# Patient Record
Sex: FEMALE | Race: WHITE | NOT HISPANIC OR LATINO | URBAN - METROPOLITAN AREA
[De-identification: names, ages, dates, MRNs, and addresses within clinical notes are randomized per-mention and may not be internally consistent; named-entity substitution may affect disease eponyms.]

---

## 2017-06-07 ENCOUNTER — INPATIENT (INPATIENT)
Facility: HOSPITAL | Age: 82
LOS: 4 days | Discharge: HOME | DRG: 378 | End: 2017-06-12
Attending: INTERNAL MEDICINE | Admitting: INTERNAL MEDICINE
Payer: MEDICARE

## 2017-06-07 VITALS
TEMPERATURE: 98 F | DIASTOLIC BLOOD PRESSURE: 57 MMHG | SYSTOLIC BLOOD PRESSURE: 101 MMHG | HEART RATE: 96 BPM | RESPIRATION RATE: 18 BRPM

## 2017-06-07 LAB
ALBUMIN SERPL ELPH-MCNC: 3.8 G/DL — SIGNIFICANT CHANGE UP (ref 3.3–5)
ALP SERPL-CCNC: 93 U/L — SIGNIFICANT CHANGE UP (ref 40–120)
ALT FLD-CCNC: 16 U/L RC — SIGNIFICANT CHANGE UP (ref 10–45)
ANION GAP SERPL CALC-SCNC: 12 MMOL/L — SIGNIFICANT CHANGE UP (ref 5–17)
APTT BLD: 61.5 SEC — HIGH (ref 27.5–37.4)
AST SERPL-CCNC: 20 U/L — SIGNIFICANT CHANGE UP (ref 10–40)
BASOPHILS # BLD AUTO: 0.1 K/UL — SIGNIFICANT CHANGE UP (ref 0–0.2)
BASOPHILS NFR BLD AUTO: 1.5 % — SIGNIFICANT CHANGE UP (ref 0–2)
BILIRUB SERPL-MCNC: 0.8 MG/DL — SIGNIFICANT CHANGE UP (ref 0.2–1.2)
BUN SERPL-MCNC: 38 MG/DL — HIGH (ref 7–23)
CALCIUM SERPL-MCNC: 8.9 MG/DL — SIGNIFICANT CHANGE UP (ref 8.4–10.5)
CHLORIDE SERPL-SCNC: 103 MMOL/L — SIGNIFICANT CHANGE UP (ref 96–108)
CO2 SERPL-SCNC: 24 MMOL/L — SIGNIFICANT CHANGE UP (ref 22–31)
CREAT SERPL-MCNC: 0.97 MG/DL — SIGNIFICANT CHANGE UP (ref 0.5–1.3)
EOSINOPHIL # BLD AUTO: 0.1 K/UL — SIGNIFICANT CHANGE UP (ref 0–0.5)
EOSINOPHIL NFR BLD AUTO: 2.2 % — SIGNIFICANT CHANGE UP (ref 0–6)
GLUCOSE SERPL-MCNC: 114 MG/DL — HIGH (ref 70–99)
HCT VFR BLD CALC: 20.4 % — CRITICAL LOW (ref 34.5–45)
HGB BLD-MCNC: 6.3 G/DL — CRITICAL LOW (ref 11.5–15.5)
INR BLD: 10.56 RATIO — CRITICAL HIGH (ref 0.88–1.16)
LYMPHOCYTES # BLD AUTO: 0.9 K/UL — LOW (ref 1–3.3)
LYMPHOCYTES # BLD AUTO: 23 % — SIGNIFICANT CHANGE UP (ref 13–44)
MCHC RBC-ENTMCNC: 25.5 PG — LOW (ref 27–34)
MCHC RBC-ENTMCNC: 30.9 GM/DL — LOW (ref 32–36)
MCV RBC AUTO: 82.5 FL — SIGNIFICANT CHANGE UP (ref 80–100)
MONOCYTES # BLD AUTO: 0.4 K/UL — SIGNIFICANT CHANGE UP (ref 0–0.9)
MONOCYTES NFR BLD AUTO: 10.2 % — SIGNIFICANT CHANGE UP (ref 2–14)
NEUTROPHILS # BLD AUTO: 2.5 K/UL — SIGNIFICANT CHANGE UP (ref 1.8–7.4)
NEUTROPHILS NFR BLD AUTO: 63.1 % — SIGNIFICANT CHANGE UP (ref 43–77)
PLATELET # BLD AUTO: 121 K/UL — LOW (ref 150–400)
POTASSIUM SERPL-MCNC: 4.8 MMOL/L — SIGNIFICANT CHANGE UP (ref 3.5–5.3)
POTASSIUM SERPL-SCNC: 4.8 MMOL/L — SIGNIFICANT CHANGE UP (ref 3.5–5.3)
PROT SERPL-MCNC: 6.7 G/DL — SIGNIFICANT CHANGE UP (ref 6–8.3)
PROTHROM AB SERPL-ACNC: 119.7 SEC — HIGH (ref 9.8–12.7)
RBC # BLD: 2.47 M/UL — LOW (ref 3.8–5.2)
RBC # FLD: 20.4 % — HIGH (ref 10.3–14.5)
SODIUM SERPL-SCNC: 139 MMOL/L — SIGNIFICANT CHANGE UP (ref 135–145)
WBC # BLD: 3.9 K/UL — SIGNIFICANT CHANGE UP (ref 3.8–10.5)
WBC # FLD AUTO: 3.9 K/UL — SIGNIFICANT CHANGE UP (ref 3.8–10.5)

## 2017-06-07 PROCEDURE — 93010 ELECTROCARDIOGRAM REPORT: CPT

## 2017-06-07 PROCEDURE — 99285 EMERGENCY DEPT VISIT HI MDM: CPT | Mod: 25

## 2017-06-07 PROCEDURE — 71010: CPT | Mod: 26

## 2017-06-07 RX ORDER — PANTOPRAZOLE SODIUM 20 MG/1
80 TABLET, DELAYED RELEASE ORAL ONCE
Qty: 0 | Refills: 0 | Status: COMPLETED | OUTPATIENT
Start: 2017-06-07 | End: 2017-06-07

## 2017-06-07 RX ORDER — SODIUM CHLORIDE 9 MG/ML
1000 INJECTION INTRAMUSCULAR; INTRAVENOUS; SUBCUTANEOUS ONCE
Qty: 0 | Refills: 0 | Status: COMPLETED | OUTPATIENT
Start: 2017-06-07 | End: 2017-06-07

## 2017-06-07 RX ADMIN — SODIUM CHLORIDE 500 MILLILITER(S): 9 INJECTION INTRAMUSCULAR; INTRAVENOUS; SUBCUTANEOUS at 23:00

## 2017-06-07 NOTE — ED ADULT NURSE NOTE - ED STAT RN HANDOFF DETAILS
Report given to TONI Benitez    Pts VS stable she is in no acute distress, pt tolerated 1st blood transfusion well. Pt pending bed and 2nd transfusion. Safety and comfort maintained

## 2017-06-07 NOTE — ED ADULT NURSE NOTE - OBJECTIVE STATEMENT
Pt is a 89 yo female A&Ox4 sent in from her PMD for INR of 10 as per daughter. Pt states that she takes coumadin at home for Afib, For the past few days she has been feeling increasing fatigue. Yesterday she had a dark stool and two episodes of a bloody cough. Pt denies any CP or SOB no N/V/D no fever or chills. Pt denies any pain at this time. Pt has a pmh of HTN, Afib and Bladder Ca.

## 2017-06-07 NOTE — ED PROVIDER NOTE - PROGRESS NOTE DETAILS
Spoke to daughter and patient regarding results. Discussed that she is in Afib at this time, but with GI bleed and anemia, will have to reverse INR at this time and transfuse blood. Patient and daughter in agreement w plan, discussed w Dr. Rasmussen. Admit patient to telemetry. RG

## 2017-06-07 NOTE — ED PROVIDER NOTE - ATTENDING CONTRIBUTION TO CARE
****ATTENDING**** 89yo f hx Afib on coumadin, HTN presents with elevated INR. Patient co generalized weakness, had one episode of "spitting up blood" and dark stools. Has been on coumadin long standing and no change in dose. Pt had outpt blood work that showed elevated INR. No prior hx GI bleed or ulcers. Denies cp, palp, sob. On exam, patient appears pale, chest cta b/l, +s1s2, irregular, Abd soft nd/nt +BS. Patient's VS reviewed. Check Labs, TS, Coags. Eval for anemia and supratherapeutic INR.   Patient noted to be guaiac positive in ER.

## 2017-06-07 NOTE — ED PROVIDER NOTE - MEDICAL DECISION MAKING DETAILS
****ATTENDING**** 89yo f hx Afib on coumadin, HTN presents with elevated INR. Patient co generalized weakness, had one episode of "spitting up blood" and dark stools. Has been on coumadin long standing and no change in dose. Pt had outpt blood work that showed elevated INR. No prior hx GI bleed or ulcers. Denies cp, palp, sob. On exam, patient appears pale, chest cta b/l, +s1s2, irregular, Abd soft nd/nt +BS. Patient's VS reviewed. Check Labs, TS, Coags. Eval for anemia and supratherapeutic INR.   Patient noted to be guaiac positive in ER.

## 2017-06-07 NOTE — ED PROVIDER NOTE - OBJECTIVE STATEMENT
89yo F with PMH HTN and A.fib on coumadin sent in by PMD for elevated INR. Labs drawn yesterday and pt called today. Report INR of 10. Pt endorses 1 episode of hemoptysis last week and dark stool today. + easy bruising no more than usual. Denies fever/chills, dizziness, CP, palpitations, abd pain, n/v/c/d, hematuria. 89yo F with PMH HTN and A.fib on coumadin sent in by PMD for elevated INR. Labs drawn yesterday and pt called today. Report INR of 10. Pt endorses generalized fatigue, 1 episode of hemoptysis last week and dark stool today. + easy bruising no more than usual. Denies fever/chills, dizziness, CP, palpitations, abd pain, n/v/c/d, hematuria.

## 2017-06-07 NOTE — ED PROVIDER NOTE - CONSTITUTIONAL, MLM
normal... Well appearing, well nourished, awake, alert, oriented to person, place, time/situation and in no apparent distress. pale-appearing, awake, alert, oriented to person, place, time/situation and in no apparent distress.

## 2017-06-07 NOTE — ED ADULT TRIAGE NOTE - CHIEF COMPLAINT QUOTE
abnormal lab results abnormal lab results  patient on coumadin, daughter states "INR of 10" abnormal lab results  patient on coumadin, daughter states "INR of 10"  also c/o weakness, lethargy

## 2017-06-08 DIAGNOSIS — D64.9 ANEMIA, UNSPECIFIED: ICD-10-CM

## 2017-06-08 DIAGNOSIS — K92.2 GASTROINTESTINAL HEMORRHAGE, UNSPECIFIED: ICD-10-CM

## 2017-06-08 DIAGNOSIS — I48.91 UNSPECIFIED ATRIAL FIBRILLATION: ICD-10-CM

## 2017-06-08 LAB
BLD GP AB SCN SERPL QL: NEGATIVE — SIGNIFICANT CHANGE UP
GAS PNL BLDV: SIGNIFICANT CHANGE UP
HCT VFR BLD CALC: 27.7 % — LOW (ref 34.5–45)
HCT VFR BLD CALC: 28.9 % — LOW (ref 34.5–45)
HGB BLD-MCNC: 8.5 G/DL — LOW (ref 11.5–15.5)
HGB BLD-MCNC: 8.9 G/DL — LOW (ref 11.5–15.5)
INR BLD: 2.49 RATIO — HIGH (ref 0.88–1.16)
MCHC RBC-ENTMCNC: 26.4 PG — LOW (ref 27–34)
MCHC RBC-ENTMCNC: 26.6 PG — LOW (ref 27–34)
MCHC RBC-ENTMCNC: 30.6 GM/DL — LOW (ref 32–36)
MCHC RBC-ENTMCNC: 30.8 GM/DL — LOW (ref 32–36)
MCV RBC AUTO: 86.4 FL — SIGNIFICANT CHANGE UP (ref 80–100)
MCV RBC AUTO: 86.4 FL — SIGNIFICANT CHANGE UP (ref 80–100)
PLATELET # BLD AUTO: 106 K/UL — LOW (ref 150–400)
PLATELET # BLD AUTO: 114 K/UL — LOW (ref 150–400)
PROTHROM AB SERPL-ACNC: 27.4 SEC — HIGH (ref 9.8–12.7)
RBC # BLD: 3.21 M/UL — LOW (ref 3.8–5.2)
RBC # BLD: 3.35 M/UL — LOW (ref 3.8–5.2)
RBC # FLD: 19.5 % — HIGH (ref 10.3–14.5)
RBC # FLD: 20.1 % — HIGH (ref 10.3–14.5)
RH IG SCN BLD-IMP: POSITIVE — SIGNIFICANT CHANGE UP
WBC # BLD: 3.5 K/UL — LOW (ref 3.8–10.5)
WBC # BLD: 4.8 K/UL — SIGNIFICANT CHANGE UP (ref 3.8–10.5)
WBC # FLD AUTO: 3.5 K/UL — LOW (ref 3.8–10.5)
WBC # FLD AUTO: 4.8 K/UL — SIGNIFICANT CHANGE UP (ref 3.8–10.5)

## 2017-06-08 RX ORDER — SERTRALINE 25 MG/1
50 TABLET, FILM COATED ORAL DAILY
Qty: 0 | Refills: 0 | Status: DISCONTINUED | OUTPATIENT
Start: 2017-06-08 | End: 2017-06-12

## 2017-06-08 RX ORDER — PROTHROMBIN COMPLEX CONCENTRATE (HUMAN) 25.5; 16.5; 24; 22; 22; 26 [IU]/ML; [IU]/ML; [IU]/ML; [IU]/ML; [IU]/ML; [IU]/ML
1500 POWDER, FOR SOLUTION INTRAVENOUS ONCE
Qty: 1500 | Refills: 0 | Status: COMPLETED | OUTPATIENT
Start: 2017-06-08 | End: 2017-06-08

## 2017-06-08 RX ORDER — FUROSEMIDE 40 MG
1 TABLET ORAL
Qty: 0 | Refills: 0 | COMMUNITY

## 2017-06-08 RX ORDER — FUROSEMIDE 40 MG
0 TABLET ORAL
Qty: 0 | Refills: 0 | COMMUNITY

## 2017-06-08 RX ORDER — SERTRALINE 25 MG/1
1 TABLET, FILM COATED ORAL
Qty: 0 | Refills: 0 | COMMUNITY

## 2017-06-08 RX ORDER — METOPROLOL TARTRATE 50 MG
200 TABLET ORAL DAILY
Qty: 0 | Refills: 0 | Status: DISCONTINUED | OUTPATIENT
Start: 2017-06-08 | End: 2017-06-12

## 2017-06-08 RX ORDER — FUROSEMIDE 40 MG
40 TABLET ORAL DAILY
Qty: 0 | Refills: 0 | Status: DISCONTINUED | OUTPATIENT
Start: 2017-06-08 | End: 2017-06-12

## 2017-06-08 RX ORDER — METOPROLOL TARTRATE 50 MG
1 TABLET ORAL
Qty: 0 | Refills: 0 | COMMUNITY

## 2017-06-08 RX ORDER — SPIRONOLACTONE 25 MG/1
1 TABLET, FILM COATED ORAL
Qty: 0 | Refills: 0 | COMMUNITY

## 2017-06-08 RX ORDER — SPIRONOLACTONE 25 MG/1
25 TABLET, FILM COATED ORAL DAILY
Qty: 0 | Refills: 0 | Status: DISCONTINUED | OUTPATIENT
Start: 2017-06-08 | End: 2017-06-12

## 2017-06-08 RX ORDER — SPIRONOLACTONE 25 MG/1
0 TABLET, FILM COATED ORAL
Qty: 0 | Refills: 0 | COMMUNITY

## 2017-06-08 RX ORDER — METOPROLOL TARTRATE 50 MG
0 TABLET ORAL
Qty: 0 | Refills: 0 | COMMUNITY

## 2017-06-08 RX ORDER — PANTOPRAZOLE SODIUM 20 MG/1
40 TABLET, DELAYED RELEASE ORAL EVERY 12 HOURS
Qty: 0 | Refills: 0 | Status: DISCONTINUED | OUTPATIENT
Start: 2017-06-08 | End: 2017-06-12

## 2017-06-08 RX ORDER — PHYTONADIONE (VIT K1) 5 MG
5 TABLET ORAL ONCE
Qty: 0 | Refills: 0 | Status: COMPLETED | OUTPATIENT
Start: 2017-06-08 | End: 2017-06-08

## 2017-06-08 RX ADMIN — Medication 5 MILLIGRAM(S): at 00:34

## 2017-06-08 RX ADMIN — PANTOPRAZOLE SODIUM 40 MILLIGRAM(S): 20 TABLET, DELAYED RELEASE ORAL at 16:23

## 2017-06-08 RX ADMIN — PANTOPRAZOLE SODIUM 80 MILLIGRAM(S): 20 TABLET, DELAYED RELEASE ORAL at 00:18

## 2017-06-08 RX ADMIN — Medication 40 MILLIGRAM(S): at 16:23

## 2017-06-08 RX ADMIN — PROTHROMBIN COMPLEX CONCENTRATE (HUMAN) 400 INTERNATIONAL UNIT(S): 25.5; 16.5; 24; 22; 22; 26 POWDER, FOR SOLUTION INTRAVENOUS at 00:25

## 2017-06-08 NOTE — H&P ADULT - HISTORY OF PRESENT ILLNESS
89yo F with PMH HTN and A.fib on coumadin sent in by PMD for elevated INR. Labs drawn yesterday and pt called today. Report INR of 10. Pt endorses generalized fatigue, 1 episode of hemoptysis last week and dark stool today. + easy bruising no more than usual. Denies fever/chills, dizziness, CP, palpitations, abd pain, n/v/c/d, hematuria.

## 2017-06-09 LAB
ANION GAP SERPL CALC-SCNC: 16 MMOL/L — SIGNIFICANT CHANGE UP (ref 5–17)
BUN SERPL-MCNC: 25 MG/DL — HIGH (ref 7–23)
CALCIUM SERPL-MCNC: 8.9 MG/DL — SIGNIFICANT CHANGE UP (ref 8.4–10.5)
CHLORIDE SERPL-SCNC: 100 MMOL/L — SIGNIFICANT CHANGE UP (ref 96–108)
CO2 SERPL-SCNC: 26 MMOL/L — SIGNIFICANT CHANGE UP (ref 22–31)
CREAT SERPL-MCNC: 0.71 MG/DL — SIGNIFICANT CHANGE UP (ref 0.5–1.3)
FOLATE SERPL-MCNC: 15.8 NG/ML — SIGNIFICANT CHANGE UP (ref 4.8–24.2)
GLUCOSE SERPL-MCNC: 79 MG/DL — SIGNIFICANT CHANGE UP (ref 70–99)
HCT VFR BLD CALC: 28.4 % — LOW (ref 34.5–45)
HGB BLD-MCNC: 8.4 G/DL — LOW (ref 11.5–15.5)
INR BLD: 1.73 RATIO — HIGH (ref 0.88–1.16)
MAGNESIUM SERPL-MCNC: 1.7 MG/DL — SIGNIFICANT CHANGE UP (ref 1.6–2.6)
MCHC RBC-ENTMCNC: 24.9 PG — LOW (ref 27–34)
MCHC RBC-ENTMCNC: 29.6 GM/DL — LOW (ref 32–36)
MCV RBC AUTO: 84 FL — SIGNIFICANT CHANGE UP (ref 80–100)
PHOSPHATE SERPL-MCNC: 2.9 MG/DL — SIGNIFICANT CHANGE UP (ref 2.5–4.5)
PLATELET # BLD AUTO: 122 K/UL — LOW (ref 150–400)
POTASSIUM SERPL-MCNC: 3.9 MMOL/L — SIGNIFICANT CHANGE UP (ref 3.5–5.3)
POTASSIUM SERPL-SCNC: 3.9 MMOL/L — SIGNIFICANT CHANGE UP (ref 3.5–5.3)
PROTHROM AB SERPL-ACNC: 19.8 SEC — HIGH (ref 10–13.1)
RBC # BLD: 3.38 M/UL — LOW (ref 3.8–5.2)
RBC # FLD: 21.1 % — HIGH (ref 10.3–14.5)
SODIUM SERPL-SCNC: 142 MMOL/L — SIGNIFICANT CHANGE UP (ref 135–145)
TSH SERPL-MCNC: 1.97 UIU/ML — SIGNIFICANT CHANGE UP (ref 0.27–4.2)
VIT B12 SERPL-MCNC: 695 PG/ML — SIGNIFICANT CHANGE UP (ref 243–894)
WBC # BLD: 3.79 K/UL — LOW (ref 3.8–10.5)
WBC # FLD AUTO: 3.79 K/UL — LOW (ref 3.8–10.5)

## 2017-06-09 RX ADMIN — SPIRONOLACTONE 25 MILLIGRAM(S): 25 TABLET, FILM COATED ORAL at 05:38

## 2017-06-09 RX ADMIN — SERTRALINE 50 MILLIGRAM(S): 25 TABLET, FILM COATED ORAL at 12:10

## 2017-06-09 RX ADMIN — PANTOPRAZOLE SODIUM 40 MILLIGRAM(S): 20 TABLET, DELAYED RELEASE ORAL at 17:09

## 2017-06-09 RX ADMIN — Medication 40 MILLIGRAM(S): at 05:38

## 2017-06-09 RX ADMIN — Medication 200 MILLIGRAM(S): at 05:38

## 2017-06-09 RX ADMIN — PANTOPRAZOLE SODIUM 40 MILLIGRAM(S): 20 TABLET, DELAYED RELEASE ORAL at 05:38

## 2017-06-09 NOTE — CONSULT NOTE ADULT - ASSESSMENT
Chronic AFIB  Chronic CHF  Anemia  Supratherapeutic INR    -Overall CV status stable  -Pt did present with significant anemia in the setting of an elevated INR  -Given her comorbidities, advanced age and clinical presentation the risks of long term anticoagulation with coumadin may outweigh any  potential benefit, would not resume coumadin. Could consider low dose asa as an alternative  -Cont lasix  -GI followup, if endoscopy planned can proceed with moderate CV risk  -will follow
1.  GI bleed in a setting of Advil use with supra therapeutic INR and elevated BUN/Cre ration - likely upper source (gastritis, esophagitis, PUD).  Presently hemodynamically stable with corrected Hgb   - Protonix 40 mg IV BID  - Start clear liquid diet  - Hold NSAIDs  - Hold warfarin for now until stable Hgb > 24 hrs, then can resume without loading dose  - will follow but for now hold off EGD as would not  - however, if evidence of active/recurrent bleeding with corrected INR may reconsider

## 2017-06-09 NOTE — PROGRESS NOTE ADULT - SUBJECTIVE AND OBJECTIVE BOX
Patient is a 89y old  Female who presents with a chief complaint of     SUBJECTIVE / OVERNIGHT EVENTS:  no complaints    MEDICATIONS  (STANDING):  sertraline 50milliGRAM(s) Oral daily  metoprolol succinate ER 200milliGRAM(s) Oral daily  furosemide    Tablet 40milliGRAM(s) Oral daily  spironolactone 25milliGRAM(s) Oral daily  pantoprazole  Injectable 40milliGRAM(s) IV Push every 12 hours    MEDICATIONS  (PRN):      Vital Signs Last 24 Hrs  T(C): 36.7, Max: 36.8 (06-08 @ 17:18)  HR: 85 (84 - 92)  BP: 112/67 (112/67 - 146/71)  RR: 18 (18 - 18)  SpO2: 97% (93% - 97%)  Wt(kg): --  CAPILLARY BLOOD GLUCOSE    I&O's Summary  I & Os for 24h ending 09 Jun 2017 07:00  =============================================  IN: 120 ml / OUT: 2200 ml / NET: -2080 ml    I & Os for current day (as of 09 Jun 2017 11:53)  =============================================  IN: 240 ml / OUT: 1400 ml / NET: -1160 ml      PHYSICAL EXAM:  GENERAL: NAD, well-developed  HEAD:  Atraumatic, Normocephalic  EYES: EOMI, PERRLA, conjunctiva and sclera clear  NECK: Supple, No JVD  CHEST/LUNG: Clear to auscultation bilaterally; No wheeze  HEART: Regular rate and rhythm; No murmurs, rubs, or gallops  ABDOMEN: Soft, Nontender, Nondistended; Bowel sounds present  EXTREMITIES:  2+ Peripheral Pulses, No clubbing, cyanosis, or edema  PSYCH: AAOx3  NEUROLOGY: non-focal  SKIN: No rashes or lesions    LABS:                        8.4    3.79  )-----------( 122      ( 09 Jun 2017 07:27 )             28.4     06-09    142  |  100  |  25<H>  ----------------------------<  79  3.9   |  26  |  0.71    Ca    8.9      09 Jun 2017 07:30  Phos  2.9     06-09  Mg     1.7     06-09    TPro  6.7  /  Alb  3.8  /  TBili  0.8  /  DBili  x   /  AST  20  /  ALT  16  /  AlkPhos  93  06-07    PT/INR - ( 09 Jun 2017 07:27 )   PT: 19.8 sec;   INR: 1.73 ratio         PTT - ( 07 Jun 2017 23:05 )  PTT:61.5 sec          RADIOLOGY & ADDITIONAL TESTS:    Imaging Personally Reviewed:    Consultant(s) Notes Reviewed:  gi    Care Discussed with Consultants/Other Providers:

## 2017-06-09 NOTE — CONSULT NOTE ADULT - GASTROINTESTINAL DETAILS
bowel sounds normal/nontender/no rebound tenderness/no organomegaly/no masses palpable/no guarding/soft/no distention/no bruit

## 2017-06-09 NOTE — CONSULT NOTE ADULT - SUBJECTIVE AND OBJECTIVE BOX
CHIEF COMPLAINT:    HPI:  89yo F , Yoruba speaking, with PMH HTN and A.fib on coumadin sent in by PMD for elevated INR. Labs drawn yesterday and pt called today. Report INR of 10. Pt endorses generalized fatigue, 1 episode of hemoptysis last week and dark stool today. + easy bruising no more than usual. Denies fever/chills, dizziness, CP, palpitations, abd pain, n/v/c/d, hematuria. Complains of mild left leg pain and occasional dyspnea.      PAST MEDICAL & SURGICAL HISTORY:  Atrial fibrillation  Anemia  HTN (Hypertension)  S/P Appendectomy          PREVIOUS DIAGNOSTIC TESTING:    [ ] Echocardiogram:  [ ]  Catheterization:  [ ] Stress Test:  	    MEDICATIONS:  MEDICATIONS  (STANDING):  sertraline 50milliGRAM(s) Oral daily  metoprolol succinate ER 200milliGRAM(s) Oral daily  furosemide    Tablet 40milliGRAM(s) Oral daily  spironolactone 25milliGRAM(s) Oral daily  pantoprazole  Injectable 40milliGRAM(s) IV Push every 12 hours      FAMILY HISTORY:  No pertinent family history in first degree relatives      SOCIAL HISTORY:    [ ] Non-smoker  [ ] Smoker  [ ] Alcohol    Allergies    No Known Allergies    Intolerances    	    REVIEW OF SYSTEMS:  CONSTITUTIONAL: No fever, weight loss, or fatigue  EYES: No eye pain, visual disturbances, or discharge  ENMT:  No difficulty hearing, tinnitus, vertigo; No sinus or throat pain  NECK: No pain or stiffness  RESPIRATORY: No cough, wheezing, chills or hemoptysis; No Shortness of Breath  CARDIOVASCULAR: No chest pain, palpitations, passing out, dizziness, or leg swelling  GASTROINTESTINAL: No abdominal or epigastric pain. No nausea, vomiting, or hematemesis; No diarrhea or constipation. No melena or hematochezia.  GENITOURINARY: No dysuria, frequency, hematuria, or incontinence  NEUROLOGICAL: No headaches, memory loss, loss of strength, numbness, or tremors  SKIN: No itching, burning, rashes, or lesions   	    [ ] All others negative	  [ ] Unable to obtain    PHYSICAL EXAM:  T(C): 36.7, Max: 36.8 (06-08 @ 17:18)  HR: 85 (84 - 92)  BP: 112/67 (112/67 - 146/71)  RR: 18 (18 - 18)  SpO2: 97% (93% - 97%)  Wt(kg): --  I&O's Summary  I & Os for 24h ending 09 Jun 2017 07:00  =============================================  IN: 120 ml / OUT: 2200 ml / NET: -2080 ml    I & Os for current day (as of 09 Jun 2017 13:09)  =============================================  IN: 240 ml / OUT: 1400 ml / NET: -1160 ml      Appearance: Normal	  Psychiatry: A & O x 3, Mood & affect appropriate  HEENT:   Normal oral mucosa, PERRL, EOMI	  Lymphatic: No lymphadenopathy  Cardiovascular: irregular, S1 S2,RRR, No JVD, No murmurs  Respiratory: Lungs clear to auscultation	  Gastrointestinal:  Soft, Non-tender, + BS	  Skin: No rashes, No ecchymoses, No cyanosis	  Neurologic: Non-focal  Extremities: + LE edema  Vascular: Peripheral pulses palpable 2+ bilaterally    TELEMETRY: 	    ECG:  	afib @ 82, inferior/anterosept infarct  RADIOLOGY:  OTHER: 	  	  LABS:	 	    CARDIAC MARKERS:                                  8.4    3.79  )-----------( 122      ( 09 Jun 2017 07:27 )             28.4     06-09    142  |  100  |  25<H>  ----------------------------<  79  3.9   |  26  |  0.71    Ca    8.9      09 Jun 2017 07:30  Phos  2.9     06-09  Mg     1.7     06-09    TPro  6.7  /  Alb  3.8  /  TBili  0.8  /  DBili  x   /  AST  20  /  ALT  16  /  AlkPhos  93  06-07    PT/INR - ( 09 Jun 2017 07:27 )   PT: 19.8 sec;   INR: 1.73 ratio         PTT - ( 07 Jun 2017 23:05 )  PTT:61.5 sec  proBNP:   Lipid Profile:   HgA1c:   TSH: Thyroid Stimulating Hormone, Serum: 1.97 uIU/mL (06-09 @ 07:30)      ASSESSMENT/PLAN:

## 2017-06-09 NOTE — PROGRESS NOTE ADULT - PROBLEM SELECTOR PLAN 3
rate controlled  is risk of coumadin more than benefits?  will get cardio opinion.  pt gets INR every 3 months as out pt.

## 2017-06-09 NOTE — CONSULT NOTE ADULT - SUBJECTIVE AND OBJECTIVE BOX
89 yo woman referred in by PMD for elevated INR and found to be profoundly anemic in ER.  Patient herself denies odynophagia ,dysphagia, early satiety, abdominal pain or cramps, nausea/vomiting, weight loss, BRBPR or change in bowel habits.  She does note a dark black stool, formed, two days ago but no bowel movements since.  She denies feeling weak, dizzy or short of breath.  Denies chest pain or palpitations.  Takes Advil for headaches, along with warfarin.  No prior history of GI bleeding, urine bleeding, epistaxis or gum bleeding.  Notes recent onset of easy bruising.  At present patient's main complaint is frequent urination from "the water pill" and right leg pain.    PMH: Afib, HTN, told of anemia in the past    PSH: Appendectomy.  Can't recall date of last colonoscopy    ALL: NKA    SH: lives with daughter, no active toxic habits    FH: no history of GI related cancer, easy bleeding/bruising    MEDS:  sertraline 50milliGRAM(s) Oral daily  metoprolol succinate ER 200milliGRAM(s) Oral daily  furosemide    Tablet 40milliGRAM(s) Oral daily  spironolactone 25milliGRAM(s) Oral daily  pantoprazole  Injectable 40milliGRAM(s) IV Push every 12 hours - started in ER  warfarin - held    LABS:                        8.9    4.8   )-----------( 114      ( 08 Jun 2017 20:52 )             28.9     06-07    139  |  103  |  38<H>  ----------------------------<  114<H>  4.8   |  24  |  0.97    Ca    8.9      07 Jun 2017 23:05    TPro  6.7  /  Alb  3.8  /  TBili  0.8  /  DBili  x   /  AST  20  /  ALT  16  /  AlkPhos  93  06-07    PT/INR - ( 08 Jun 2017 01:00 )   PT: 27.4 sec;   INR: 2.49     PTT - ( 07 Jun 2017 23:05 )  PTT:61.5 sec

## 2017-06-10 DIAGNOSIS — K92.1 MELENA: ICD-10-CM

## 2017-06-10 LAB
ANION GAP SERPL CALC-SCNC: 12 MMOL/L — SIGNIFICANT CHANGE UP (ref 5–17)
BUN SERPL-MCNC: 18 MG/DL — SIGNIFICANT CHANGE UP (ref 7–23)
CALCIUM SERPL-MCNC: 8.9 MG/DL — SIGNIFICANT CHANGE UP (ref 8.4–10.5)
CHLORIDE SERPL-SCNC: 98 MMOL/L — SIGNIFICANT CHANGE UP (ref 96–108)
CO2 SERPL-SCNC: 28 MMOL/L — SIGNIFICANT CHANGE UP (ref 22–31)
CREAT SERPL-MCNC: 0.81 MG/DL — SIGNIFICANT CHANGE UP (ref 0.5–1.3)
GLUCOSE SERPL-MCNC: 86 MG/DL — SIGNIFICANT CHANGE UP (ref 70–99)
HCT VFR BLD CALC: 27.6 % — LOW (ref 34.5–45)
HGB BLD-MCNC: 8.2 G/DL — LOW (ref 11.5–15.5)
MAGNESIUM SERPL-MCNC: 1.3 MG/DL — LOW (ref 1.6–2.6)
MCHC RBC-ENTMCNC: 24.8 PG — LOW (ref 27–34)
MCHC RBC-ENTMCNC: 29.7 GM/DL — LOW (ref 32–36)
MCV RBC AUTO: 83.6 FL — SIGNIFICANT CHANGE UP (ref 80–100)
PHOSPHATE SERPL-MCNC: 2.6 MG/DL — SIGNIFICANT CHANGE UP (ref 2.5–4.5)
PLATELET # BLD AUTO: 148 K/UL — LOW (ref 150–400)
POTASSIUM SERPL-MCNC: 3.6 MMOL/L — SIGNIFICANT CHANGE UP (ref 3.5–5.3)
POTASSIUM SERPL-SCNC: 3.6 MMOL/L — SIGNIFICANT CHANGE UP (ref 3.5–5.3)
RBC # BLD: 3.3 M/UL — LOW (ref 3.8–5.2)
RBC # FLD: 21.6 % — HIGH (ref 10.3–14.5)
SODIUM SERPL-SCNC: 138 MMOL/L — SIGNIFICANT CHANGE UP (ref 135–145)
WBC # BLD: 4.11 K/UL — SIGNIFICANT CHANGE UP (ref 3.8–10.5)
WBC # FLD AUTO: 4.11 K/UL — SIGNIFICANT CHANGE UP (ref 3.8–10.5)

## 2017-06-10 RX ADMIN — PANTOPRAZOLE SODIUM 40 MILLIGRAM(S): 20 TABLET, DELAYED RELEASE ORAL at 17:03

## 2017-06-10 RX ADMIN — SERTRALINE 50 MILLIGRAM(S): 25 TABLET, FILM COATED ORAL at 11:23

## 2017-06-10 RX ADMIN — Medication 200 MILLIGRAM(S): at 05:40

## 2017-06-10 RX ADMIN — Medication 40 MILLIGRAM(S): at 05:40

## 2017-06-10 RX ADMIN — PANTOPRAZOLE SODIUM 40 MILLIGRAM(S): 20 TABLET, DELAYED RELEASE ORAL at 05:40

## 2017-06-10 RX ADMIN — SPIRONOLACTONE 25 MILLIGRAM(S): 25 TABLET, FILM COATED ORAL at 05:40

## 2017-06-10 NOTE — PROGRESS NOTE ADULT - ASSESSMENT
89yo F with PMH HTN and A.fib on coumadin sent in by PMD for elevated INR. Labs drawn yesterday and pt called today. Report INR of 10. Pt endorses generalized fatigue, 1 episode of hemoptysis last week and dark stool today. + easy bruising no more than usual. Denies fever/chills, dizziness, CP, palpitations, abd pain, n/v/c/d, hematuria.

## 2017-06-10 NOTE — PROGRESS NOTE ADULT - SUBJECTIVE AND OBJECTIVE BOX
CARDIOLOGY FOLLOW UP NOTE - DR. FLOOD    Subjective:    no chest pain/palps    PHYSICAL EXAM:  T(C): 37, Max: 37 (06-10 @ 11:19)  HR: 86 (76 - 86)  BP: 116/69 (102/55 - 116/69)  RR: 18 (18 - 18)  SpO2: 99% (96% - 99%)  Wt(kg): --  I&O's Summary  I & Os for 24h ending 10 Philip 2017 07:00  =============================================  IN: 1420 ml / OUT: 2450 ml / NET: -1030 ml    I & Os for current day (as of 10 Philip 2017 15:43)  =============================================  IN: 480 ml / OUT: 1500 ml / NET: -1020 ml      Appearance: Normal	  Cardiovascular: Normal S1 S2, iireg   Respiratory: Lungs clear to auscultation	  Gastrointestinal:  Soft, Non-tender, + BS	  Extremities: Normal range of motion, No clubbing, cyanosis or edema      MEDICATIONS  (STANDING):  sertraline 50milliGRAM(s) Oral daily  metoprolol succinate ER 200milliGRAM(s) Oral daily  furosemide    Tablet 40milliGRAM(s) Oral daily  spironolactone 25milliGRAM(s) Oral daily  pantoprazole  Injectable 40milliGRAM(s) IV Push every 12 hours      TELEMETRY: 	    ECG:  	  RADIOLOGY:   DIAGNOSTIC TESTING:  [ ] Echocardiogram:  [ ] Catheterization:  [ ] Stress Test:    OTHER: 	    LABS:	 	    CARDIAC MARKERS:                                8.2    4.11  )-----------( 148      ( 10 Philip 2017 08:09 )             27.6     06-10    138  |  98  |  18  ----------------------------<  86  3.6   |  28  |  0.81    Ca    8.9      10 Philip 2017 08:01  Phos  2.6     06-10  Mg     1.3     06-10      proBNP:   PT/INR - ( 09 Jun 2017 07:27 )   PT: 19.8 sec;   INR: 1.73 ratio           Lipid Profile:   HgA1c:

## 2017-06-10 NOTE — PROGRESS NOTE ADULT - PROBLEM SELECTOR PLAN 3
rate controlled  is risk of coumadin more than benefits?    pt gets INR every 3 months as out pt.  seen by cardio.  I agree that risk of bleeding and fall on a/c outweigh benefits of it.  will start low dose asa on discharge.

## 2017-06-10 NOTE — PROGRESS NOTE ADULT - ASSESSMENT
Chronic AFIB  Chronic CHF  Anemia  Supratherapeutic INR    -Overall CV status stable  -af rate controlled  -off of a/c sec to bleed   -start asa 81 of ok by gi  -Cont lasix/metoprolol  -GI followup   -d/c planning

## 2017-06-10 NOTE — PROGRESS NOTE ADULT - SUBJECTIVE AND OBJECTIVE BOX
Patient is a 89y old  Female who presents with a chief complaint of     SUBJECTIVE / OVERNIGHT EVENTS:  no complaints. no cp no sob  no melena    MEDICATIONS  (STANDING):  sertraline 50milliGRAM(s) Oral daily  metoprolol succinate ER 200milliGRAM(s) Oral daily  furosemide    Tablet 40milliGRAM(s) Oral daily  spironolactone 25milliGRAM(s) Oral daily  pantoprazole  Injectable 40milliGRAM(s) IV Push every 12 hours    MEDICATIONS  (PRN):      Vital Signs Last 24 Hrs  T(C): 36.7, Max: 36.8 (06-09 @ 19:53)  HR: 76 (76 - 85)  BP: 104/56 (102/55 - 112/67)  RR: 18 (18 - 18)  SpO2: 96% (96% - 97%)  Wt(kg): --  CAPILLARY BLOOD GLUCOSE    I&O's Summary    I & Os for current day (as of 10 Philip 2017 08:21)  =============================================  IN: 1420 ml / OUT: 2450 ml / NET: -1030 ml      PHYSICAL EXAM:  GENERAL: NAD, well-developed  HEAD:  Atraumatic, Normocephalic  EYES: EOMI, PERRLA, conjunctiva and sclera clear  NECK: Supple, No JVD  CHEST/LUNG: Clear to auscultation bilaterally; No wheeze  HEART: Regular rate and rhythm; No murmurs, rubs, or gallops  ABDOMEN: Soft, Nontender, Nondistended; Bowel sounds present  EXTREMITIES:  2+ Peripheral Pulses, No clubbing, cyanosis, or edema  PSYCH: AAOx3  NEUROLOGY: non-focal  SKIN: No rashes or lesions    LABS:                        8.4    3.79  )-----------( 122      ( 09 Jun 2017 07:27 )             28.4     06-09    142  |  100  |  25<H>  ----------------------------<  79  3.9   |  26  |  0.71    Ca    8.9      09 Jun 2017 07:30  Phos  2.9     06-09  Mg     1.7     06-09      PT/INR - ( 09 Jun 2017 07:27 )   PT: 19.8 sec;   INR: 1.73 ratio                   RADIOLOGY & ADDITIONAL TESTS:    Imaging Personally Reviewed:    Consultant(s) Notes Reviewed:      Care Discussed with Consultants/Other Providers:

## 2017-06-10 NOTE — PHYSICAL THERAPY INITIAL EVALUATION ADULT - ADDITIONAL COMMENTS
PLOF reported by daughter and granddaughter. PLOF reported by daughter and granddaughter.    Per patient - primarily stays at home and ambulates with RW

## 2017-06-10 NOTE — PROGRESS NOTE ADULT - SUBJECTIVE AND OBJECTIVE BOX
SUBJECTIVE:  Feels well. Eating lunch. No BM overnight or this AM. No abdominal pain.  _____________________________________________________  OBJECTIVE:    T(C): 37, Max: 37 (06-10 @ 11:19)  HR: 86  BP: 116/69  RR: 18  SpO2: 99%  Wt(kg): --      PHYSICAL EXAM:      Constitutional: Comfortable-appearing, sitting in chair    Gastrointestinal: Protuberant with RLQ scar, NABS, soft, nontender    _____________________________________________________  LABS:                        8.2    4.11  )-----------( 148      ( 10 Philip 2017 08:09 )             27.6     06-10    138  |  98  |  18  ----------------------------<  86  3.6   |  28  |  0.81    Ca    8.9      10 Philip 2017 08:01  Phos  2.6     06-10  Mg     1.3     06-10        _____________________________________________________  ACTIVE MEDS:  MEDICATIONS  (STANDING):  sertraline 50milliGRAM(s) Oral daily  metoprolol succinate ER 200milliGRAM(s) Oral daily  furosemide    Tablet 40milliGRAM(s) Oral daily  spironolactone 25milliGRAM(s) Oral daily  pantoprazole  Injectable 40milliGRAM(s) IV Push every 12 hours    MEDICATIONS  (PRN):    _____________________________________________________  ASSESSMENT:  89yFemale s/p GI bleed (presumed UGI given the presentation with melena) in the setting of NSAID use and a high INR, now stable on pantoprazole, off Coumadin. Managed conservatively due to advance age and hemodynamic stability. H/H now stable after transfusion.    PLAN: Continue pantoprazole 40 IV q12h; may change to 40 orally once daily on discharge home, to continue indefinitely, as long as patient remains on ASA (off Coumadin, as per hospitalist note)              Check daily CBC          Tano Bryan M.D.  (O) [phone]  (C) [phone]

## 2017-06-10 NOTE — PHYSICAL THERAPY INITIAL EVALUATION ADULT - GENERAL OBSERVATIONS, REHAB EVAL
3 attempts made to see pt. 1st attempt the pt was supine in bed and refused pt services due to fatigue, 2nd attempt the pt was seated in chair and refused due to wanting to eat, and 3rd attempt the pt was seated in chair with daughter and granddaughter present in room.

## 2017-06-10 NOTE — PHYSICAL THERAPY INITIAL EVALUATION ADULT - PERTINENT HX OF CURRENT PROBLEM, REHAB EVAL
Pt is a 89 yo female that was admitted by PMD due to elevated INR(10). Pt reported generalized fatigure, 1 episode of hemoptysis last week and dark stool on day of admission. + easy bruising no more than usual. Pt is primarily Croatian speaking, but able to make needs known in English. Daughter and granddaughter present in room at time of evaluation and were able to assist in providing PLOF and living arrangements.

## 2017-06-11 LAB
ANION GAP SERPL CALC-SCNC: 10 MMOL/L — SIGNIFICANT CHANGE UP (ref 5–17)
BUN SERPL-MCNC: 20 MG/DL — SIGNIFICANT CHANGE UP (ref 7–23)
CALCIUM SERPL-MCNC: 9.1 MG/DL — SIGNIFICANT CHANGE UP (ref 8.4–10.5)
CHLORIDE SERPL-SCNC: 96 MMOL/L — SIGNIFICANT CHANGE UP (ref 96–108)
CO2 SERPL-SCNC: 32 MMOL/L — HIGH (ref 22–31)
CREAT SERPL-MCNC: 0.85 MG/DL — SIGNIFICANT CHANGE UP (ref 0.5–1.3)
GLUCOSE SERPL-MCNC: 70 MG/DL — SIGNIFICANT CHANGE UP (ref 70–99)
HCT VFR BLD CALC: 25.7 % — LOW (ref 34.5–45)
HGB BLD-MCNC: 7.6 G/DL — LOW (ref 11.5–15.5)
INR BLD: 1.6 RATIO — HIGH (ref 0.88–1.16)
MCHC RBC-ENTMCNC: 24.8 PG — LOW (ref 27–34)
MCHC RBC-ENTMCNC: 29.6 GM/DL — LOW (ref 32–36)
MCV RBC AUTO: 84 FL — SIGNIFICANT CHANGE UP (ref 80–100)
PLATELET # BLD AUTO: 144 K/UL — LOW (ref 150–400)
POTASSIUM SERPL-MCNC: 3.7 MMOL/L — SIGNIFICANT CHANGE UP (ref 3.5–5.3)
POTASSIUM SERPL-SCNC: 3.7 MMOL/L — SIGNIFICANT CHANGE UP (ref 3.5–5.3)
PROTHROM AB SERPL-ACNC: 18.2 SEC — HIGH (ref 10–13.1)
RBC # BLD: 3.06 M/UL — LOW (ref 3.8–5.2)
RBC # FLD: 21.9 % — HIGH (ref 10.3–14.5)
SODIUM SERPL-SCNC: 138 MMOL/L — SIGNIFICANT CHANGE UP (ref 135–145)
WBC # BLD: 3.51 K/UL — LOW (ref 3.8–10.5)
WBC # FLD AUTO: 3.51 K/UL — LOW (ref 3.8–10.5)

## 2017-06-11 RX ORDER — ASPIRIN/CALCIUM CARB/MAGNESIUM 324 MG
81 TABLET ORAL DAILY
Qty: 0 | Refills: 0 | Status: DISCONTINUED | OUTPATIENT
Start: 2017-06-11 | End: 2017-06-12

## 2017-06-11 RX ADMIN — PANTOPRAZOLE SODIUM 40 MILLIGRAM(S): 20 TABLET, DELAYED RELEASE ORAL at 17:15

## 2017-06-11 RX ADMIN — Medication 40 MILLIGRAM(S): at 05:52

## 2017-06-11 RX ADMIN — SPIRONOLACTONE 25 MILLIGRAM(S): 25 TABLET, FILM COATED ORAL at 05:52

## 2017-06-11 RX ADMIN — Medication 200 MILLIGRAM(S): at 05:52

## 2017-06-11 RX ADMIN — SERTRALINE 50 MILLIGRAM(S): 25 TABLET, FILM COATED ORAL at 11:14

## 2017-06-11 RX ADMIN — Medication 81 MILLIGRAM(S): at 17:15

## 2017-06-11 RX ADMIN — PANTOPRAZOLE SODIUM 40 MILLIGRAM(S): 20 TABLET, DELAYED RELEASE ORAL at 05:52

## 2017-06-11 NOTE — PROGRESS NOTE ADULT - ASSESSMENT
Chronic AFIB  Chronic CHF  Anemia  Supratherapeutic INR    -Overall CV status stable  -af rate controlled  -cont bb  lasix daily   start asa 81mg daily with ppi  no further a/c in setting of gi bleed/anemia

## 2017-06-11 NOTE — PROGRESS NOTE ADULT - PROBLEM SELECTOR PLAN 1
tolerating diet, no more bleeding, H&H stable.  follow up today's labs.  if hgb stable pt was cleared by gi for discharge.

## 2017-06-11 NOTE — PROGRESS NOTE ADULT - SUBJECTIVE AND OBJECTIVE BOX
Patient is a 89y old  Female who presents with a chief complaint of     SUBJECTIVE / OVERNIGHT EVENTS:  no complaints, miguel diet, no melena    MEDICATIONS  (STANDING):  sertraline 50milliGRAM(s) Oral daily  metoprolol succinate ER 200milliGRAM(s) Oral daily  furosemide    Tablet 40milliGRAM(s) Oral daily  spironolactone 25milliGRAM(s) Oral daily  pantoprazole  Injectable 40milliGRAM(s) IV Push every 12 hours    MEDICATIONS  (PRN):      Vital Signs Last 24 Hrs  T(C): 36.7, Max: 37 (06-10 @ 11:19)  HR: 91 (78 - 91)  BP: 104/58 (95/52 - 116/69)  RR: 18 (18 - 18)  SpO2: 95% (95% - 99%)  Wt(kg): --  CAPILLARY BLOOD GLUCOSE    I&O's Summary  I & Os for 24h ending 11 Jun 2017 07:00  =============================================  IN: 1320 ml / OUT: 2000 ml / NET: -680 ml    I & Os for current day (as of 11 Jun 2017 08:29)  =============================================  IN: 240 ml / OUT: 300 ml / NET: -60 ml      PHYSICAL EXAM:  GENERAL: NAD, well-developed  HEAD:  Atraumatic, Normocephalic  EYES: EOMI, PERRLA, conjunctiva and sclera clear  NECK: Supple, No JVD  CHEST/LUNG: Clear to auscultation bilaterally; No wheeze  HEART: Regular rate and rhythm; No murmurs, rubs, or gallops  ABDOMEN: Soft, Nontender, Nondistended; Bowel sounds present  EXTREMITIES:  2+ Peripheral Pulses, No clubbing, cyanosis, or edema  PSYCH: AAOx3  NEUROLOGY: non-focal  SKIN: No rashes or lesions    LABS:                        8.2    4.11  )-----------( 148      ( 10 Philip 2017 08:09 )             27.6     06-10    138  |  98  |  18  ----------------------------<  86  3.6   |  28  |  0.81    Ca    8.9      10 Philip 2017 08:01  Phos  2.6     06-10  Mg     1.3     06-10                RADIOLOGY & ADDITIONAL TESTS:    Imaging Personally Reviewed:    Consultant(s) Notes Reviewed:      Care Discussed with Consultants/Other Providers:

## 2017-06-11 NOTE — PROVIDER CONTACT NOTE (OTHER) - SITUATION
Patient placed on bed and chair alarm for safety precautions.  Observed to be noncompliant with safety measures, getting up on her own, unplugging the chair alarm from the pad.
admitted for GI bleed,

## 2017-06-11 NOTE — PROVIDER CONTACT NOTE (OTHER) - BACKGROUND
Tru, on coumadin at home.
Patient admitted for GI hemorrhage.  PMH of anemia, atrial fibrillation, HTN.

## 2017-06-11 NOTE — PROVIDER CONTACT NOTE (OTHER) - ACTION/TREATMENT ORDERED:
Education provided to patient regarding necessity for safety measures.  Patient verbalized understanding of education given and provided teach back.  RN and PCA to continue to very closely monitor.

## 2017-06-11 NOTE — PROGRESS NOTE ADULT - SUBJECTIVE AND OBJECTIVE BOX
SUBJECTIVE: Feels well. Would like to go home today. No abdominal pain. Eating well. Has not had a BM, but feels like she is going to have one this morning.    _____________________________________________________  OBJECTIVE:    T(C): 36.7, Max: 37 (06-10 @ 11:19)  HR: 91  BP: 104/58  RR: 18  SpO2: 95%  Wt(kg): --    General = Comfortable-appearing, no acute distress  Abdomen = Non-distended, normal active bowel sounds, soft, nontender, no palpable mass or organomegaly, no bruit  _____________________________________________________  LABS:                        8.2    4.11  )-----------( 148      ( 10 Philip 2017 08:09 )             27.6     06-10    138  |  98  |  18  ----------------------------<  86  3.6   |  28  |  0.81    Ca    8.9      10 Philip 2017 08:01  Phos  2.6     06-10  Mg     1.3     06-10          _____________________________________________________  ACTIVE MEDS:  MEDICATIONS  (STANDING):  sertraline 50milliGRAM(s) Oral daily  metoprolol succinate ER 200milliGRAM(s) Oral daily  furosemide    Tablet 40milliGRAM(s) Oral daily  spironolactone 25milliGRAM(s) Oral daily  pantoprazole  Injectable 40milliGRAM(s) IV Push every 12 hours    MEDICATIONS  (PRN):    _____________________________________________________  ASSESSMENT:  89yFemale s/p GI bleed in setting of supratherapeutic INR and NSAID usage, managed conservatively.    PLAN: If today's labs are okay (still pending now), OK for discharge home from GI standpoint, on pantoprazole 40 mg po daily             RTO Dr. Villela 7-10 days for GI follow-up        Tano Brayn M.D.  (O) [phone]  (C) [phone]

## 2017-06-11 NOTE — PROVIDER CONTACT NOTE (OTHER) - ASSESSMENT
Upon assessment, patient is resting comfortably in chair after having gotten up on her own and setting off the alarm.  NAD, with no complaints at this time.

## 2017-06-11 NOTE — PROGRESS NOTE ADULT - SUBJECTIVE AND OBJECTIVE BOX
CARDIOLOGY FOLLOW UP NOTE - DR. FLOOD    Subjective:  no chest pain/sob    PHYSICAL EXAM:  T(C): 36.6, Max: 36.9 (06-10 @ 20:18)  HR: 80 (78 - 91)  BP: 114/67 (95/52 - 114/67)  RR: 18 (18 - 18)  SpO2: 97% (95% - 97%)  Wt(kg): --  I&O's Summary  I & Os for 24h ending 11 Jun 2017 07:00  =============================================  IN: 1320 ml / OUT: 2000 ml / NET: -680 ml    I & Os for current day (as of 11 Jun 2017 13:21)  =============================================  IN: 240 ml / OUT: 300 ml / NET: -60 ml      Appearance: Normal	  Cardiovascular: Normal S1 S2 irreg  Respiratory: Lungs clear to auscultation	  Gastrointestinal:  Soft, Non-tender, + BS	  Extremities: Normal range of motion, No clubbing, cyanosis or edema      MEDICATIONS  (STANDING):  sertraline 50milliGRAM(s) Oral daily  metoprolol succinate ER 200milliGRAM(s) Oral daily  furosemide    Tablet 40milliGRAM(s) Oral daily  spironolactone 25milliGRAM(s) Oral daily  pantoprazole  Injectable 40milliGRAM(s) IV Push every 12 hours  aspirin  chewable 81milliGRAM(s) Oral daily      TELEMETRY: 	    ECG:  	  RADIOLOGY:   DIAGNOSTIC TESTING:  [ ] Echocardiogram:  [ ] Catheterization:  [ ] Stress Test:    OTHER: 	    LABS:	 	    CARDIAC MARKERS:                                7.6    3.51  )-----------( 144      ( 11 Jun 2017 08:26 )             25.7     06-10    138  |  98  |  18  ----------------------------<  86  3.6   |  28  |  0.81    Ca    8.9      10 Philip 2017 08:01  Phos  2.6     06-10  Mg     1.3     06-10      proBNP:   PT/INR - ( 11 Jun 2017 08:26 )   PT: 18.2 sec;   INR: 1.60 ratio           Lipid Profile:   HgA1c:

## 2017-06-12 VITALS
RESPIRATION RATE: 19 BRPM | SYSTOLIC BLOOD PRESSURE: 103 MMHG | OXYGEN SATURATION: 97 % | HEART RATE: 82 BPM | DIASTOLIC BLOOD PRESSURE: 58 MMHG | TEMPERATURE: 98 F

## 2017-06-12 LAB
ANION GAP SERPL CALC-SCNC: 15 MMOL/L — SIGNIFICANT CHANGE UP (ref 5–17)
BUN SERPL-MCNC: 22 MG/DL — SIGNIFICANT CHANGE UP (ref 7–23)
CALCIUM SERPL-MCNC: 9.2 MG/DL — SIGNIFICANT CHANGE UP (ref 8.4–10.5)
CHLORIDE SERPL-SCNC: 95 MMOL/L — LOW (ref 96–108)
CO2 SERPL-SCNC: 30 MMOL/L — SIGNIFICANT CHANGE UP (ref 22–31)
CREAT SERPL-MCNC: 0.91 MG/DL — SIGNIFICANT CHANGE UP (ref 0.5–1.3)
GLUCOSE SERPL-MCNC: 78 MG/DL — SIGNIFICANT CHANGE UP (ref 70–99)
HCT VFR BLD CALC: 27.2 % — LOW (ref 34.5–45)
HGB BLD-MCNC: 8.1 G/DL — LOW (ref 11.5–15.5)
INR BLD: 1.41 RATIO — HIGH (ref 0.88–1.16)
MAGNESIUM SERPL-MCNC: 1.5 MG/DL — LOW (ref 1.6–2.6)
MCHC RBC-ENTMCNC: 25 PG — LOW (ref 27–34)
MCHC RBC-ENTMCNC: 29.8 GM/DL — LOW (ref 32–36)
MCV RBC AUTO: 84 FL — SIGNIFICANT CHANGE UP (ref 80–100)
PLATELET # BLD AUTO: 162 K/UL — SIGNIFICANT CHANGE UP (ref 150–400)
POTASSIUM SERPL-MCNC: 3.9 MMOL/L — SIGNIFICANT CHANGE UP (ref 3.5–5.3)
POTASSIUM SERPL-SCNC: 3.9 MMOL/L — SIGNIFICANT CHANGE UP (ref 3.5–5.3)
PROTHROM AB SERPL-ACNC: 15.3 SEC — HIGH (ref 9.8–12.7)
RBC # BLD: 3.24 M/UL — LOW (ref 3.8–5.2)
RBC # FLD: 23.1 % — HIGH (ref 10.3–14.5)
SODIUM SERPL-SCNC: 140 MMOL/L — SIGNIFICANT CHANGE UP (ref 135–145)
WBC # BLD: 3.66 K/UL — LOW (ref 3.8–10.5)
WBC # FLD AUTO: 3.66 K/UL — LOW (ref 3.8–10.5)

## 2017-06-12 PROCEDURE — 80048 BASIC METABOLIC PNL TOTAL CA: CPT

## 2017-06-12 PROCEDURE — 84100 ASSAY OF PHOSPHORUS: CPT

## 2017-06-12 PROCEDURE — 85027 COMPLETE CBC AUTOMATED: CPT

## 2017-06-12 PROCEDURE — P9016: CPT

## 2017-06-12 PROCEDURE — 71045 X-RAY EXAM CHEST 1 VIEW: CPT

## 2017-06-12 PROCEDURE — 83735 ASSAY OF MAGNESIUM: CPT

## 2017-06-12 PROCEDURE — 85610 PROTHROMBIN TIME: CPT

## 2017-06-12 PROCEDURE — 99285 EMERGENCY DEPT VISIT HI MDM: CPT | Mod: 25

## 2017-06-12 PROCEDURE — 82272 OCCULT BLD FECES 1-3 TESTS: CPT

## 2017-06-12 PROCEDURE — 86900 BLOOD TYPING SEROLOGIC ABO: CPT

## 2017-06-12 PROCEDURE — 80053 COMPREHEN METABOLIC PANEL: CPT

## 2017-06-12 PROCEDURE — 93005 ELECTROCARDIOGRAM TRACING: CPT

## 2017-06-12 PROCEDURE — 82746 ASSAY OF FOLIC ACID SERUM: CPT

## 2017-06-12 PROCEDURE — 82330 ASSAY OF CALCIUM: CPT

## 2017-06-12 PROCEDURE — 36430 TRANSFUSION BLD/BLD COMPNT: CPT

## 2017-06-12 PROCEDURE — 83605 ASSAY OF LACTIC ACID: CPT

## 2017-06-12 PROCEDURE — 85014 HEMATOCRIT: CPT

## 2017-06-12 PROCEDURE — 86923 COMPATIBILITY TEST ELECTRIC: CPT

## 2017-06-12 PROCEDURE — 97110 THERAPEUTIC EXERCISES: CPT

## 2017-06-12 PROCEDURE — 84295 ASSAY OF SERUM SODIUM: CPT

## 2017-06-12 PROCEDURE — 96365 THER/PROPH/DIAG IV INF INIT: CPT

## 2017-06-12 PROCEDURE — 82947 ASSAY GLUCOSE BLOOD QUANT: CPT

## 2017-06-12 PROCEDURE — 84132 ASSAY OF SERUM POTASSIUM: CPT

## 2017-06-12 PROCEDURE — 82435 ASSAY OF BLOOD CHLORIDE: CPT

## 2017-06-12 PROCEDURE — 82803 BLOOD GASES ANY COMBINATION: CPT

## 2017-06-12 PROCEDURE — 85730 THROMBOPLASTIN TIME PARTIAL: CPT

## 2017-06-12 PROCEDURE — 86850 RBC ANTIBODY SCREEN: CPT

## 2017-06-12 PROCEDURE — 97161 PT EVAL LOW COMPLEX 20 MIN: CPT

## 2017-06-12 PROCEDURE — 84443 ASSAY THYROID STIM HORMONE: CPT

## 2017-06-12 PROCEDURE — 82607 VITAMIN B-12: CPT

## 2017-06-12 PROCEDURE — 86901 BLOOD TYPING SEROLOGIC RH(D): CPT

## 2017-06-12 RX ORDER — MAGNESIUM SULFATE 500 MG/ML
2 VIAL (ML) INJECTION ONCE
Qty: 0 | Refills: 0 | Status: COMPLETED | OUTPATIENT
Start: 2017-06-12 | End: 2017-06-12

## 2017-06-12 RX ORDER — PANTOPRAZOLE SODIUM 20 MG/1
1 TABLET, DELAYED RELEASE ORAL
Qty: 30 | Refills: 0 | OUTPATIENT
Start: 2017-06-12 | End: 2017-07-12

## 2017-06-12 RX ORDER — WARFARIN SODIUM 2.5 MG/1
1 TABLET ORAL
Qty: 0 | Refills: 0 | COMMUNITY

## 2017-06-12 RX ORDER — ASPIRIN/CALCIUM CARB/MAGNESIUM 324 MG
1 TABLET ORAL
Qty: 0 | Refills: 0 | COMMUNITY
Start: 2017-06-12

## 2017-06-12 RX ADMIN — PANTOPRAZOLE SODIUM 40 MILLIGRAM(S): 20 TABLET, DELAYED RELEASE ORAL at 05:54

## 2017-06-12 RX ADMIN — SPIRONOLACTONE 25 MILLIGRAM(S): 25 TABLET, FILM COATED ORAL at 05:54

## 2017-06-12 RX ADMIN — Medication 50 GRAM(S): at 11:54

## 2017-06-12 RX ADMIN — SERTRALINE 50 MILLIGRAM(S): 25 TABLET, FILM COATED ORAL at 11:55

## 2017-06-12 RX ADMIN — Medication 40 MILLIGRAM(S): at 05:55

## 2017-06-12 RX ADMIN — Medication 81 MILLIGRAM(S): at 11:55

## 2017-06-12 RX ADMIN — Medication 200 MILLIGRAM(S): at 05:54

## 2017-06-12 NOTE — DISCHARGE NOTE ADULT - CARE PROVIDER_API CALL
David Villela), Gastroenterology  [address]  [place] [address]  Phone: [phone]  Fax: [phone]

## 2017-06-12 NOTE — DISCHARGE NOTE ADULT - SECONDARY DIAGNOSIS.
GI bleed INR (international normal ratio) abnormal Atrial fibrillation CHF (congestive heart failure)

## 2017-06-12 NOTE — DISCHARGE NOTE ADULT - HOSPITAL COURSE
per attending 89yo F with PMH HTN and A.fib on coumadin sent in by PMD for elevated INR. Labs drawn yesterday and pt called today. Report INR of 10. Pt endorses generalized fatigue,   x1 episode of hemoptysis last week and dark stool today. + easy bruising no more than usual. Denies fever/chills, dizziness, CP, palpitations, abd pain, n/v/c/d, hematuria    Dx: Supra INR 10.56 --> 2.49  s/p kcentra / vit k po        Anemia Hgb 6.3 --> 2units prbc        GIB        AFib    ED:  Guaiac Positive, s/p Kcentra /Vit k po, 2units PRBC's, Protonic 80mg IV x1    6/8- INR improved, H/H stable, recheck 2030 (   )  6/9	GI- continue to monitor for any signs of bleeding, cbc, continue with protonix , 	clears, no EGD at this time as pt is stable , if bleeds again may consider   Cards- continue to hold coumadin, monitor cbc

## 2017-06-12 NOTE — PROGRESS NOTE ADULT - SUBJECTIVE AND OBJECTIVE BOX
Patient is a 89y old  Female who presents with a chief complaint of     SUBJECTIVE / OVERNIGHT EVENTS:  No chest pain, No shortness of breath, No complaints, No events overnight.     MEDICATIONS  (STANDING):  sertraline 50milliGRAM(s) Oral daily  metoprolol succinate ER 200milliGRAM(s) Oral daily  furosemide    Tablet 40milliGRAM(s) Oral daily  spironolactone 25milliGRAM(s) Oral daily  pantoprazole  Injectable 40milliGRAM(s) IV Push every 12 hours  aspirin  chewable 81milliGRAM(s) Oral daily  magnesium sulfate  IVPB 2Gram(s) IV Intermittent once    MEDICATIONS  (PRN):      Vital Signs Last 24 Hrs  T(C): 36.6, Max: 37.2 (06-11 @ 20:14)  HR: 83 (83 - 93)  BP: 104/48 (104/48 - 104/56)  RR: 18 (18 - 18)  SpO2: 94% (94% - 94%)  Wt(kg): --  CAPILLARY BLOOD GLUCOSE    I&O's Summary    I & Os for current day (as of 12 Jun 2017 11:38)  =============================================  IN: 1080 ml / OUT: 1400 ml / NET: -320 ml      PHYSICAL EXAM:  GENERAL: NAD, well-developed  HEAD:  Atraumatic, Normocephalic  EYES: EOMI, PERRLA, conjunctiva and sclera clear  NECK: Supple, No JVD  CHEST/LUNG: Clear to auscultation bilaterally; No wheeze  HEART: Regular rate and rhythm; No murmurs, rubs, or gallops  ABDOMEN: Soft, Nontender, Nondistended; Bowel sounds present  EXTREMITIES:  2+ Peripheral Pulses, No clubbing, cyanosis, or edema  PSYCH: AAOx3  NEUROLOGY: non-focal  SKIN: No rashes or lesions    LABS:                        8.1    3.66  )-----------( 162      ( 12 Jun 2017 08:36 )             27.2     06-12    140  |  95<L>  |  22  ----------------------------<  78  3.9   |  30  |  0.91    Ca    9.2      12 Jun 2017 08:36  Mg     1.5     06-12      PT/INR - ( 12 Jun 2017 07:13 )   PT: 15.3 sec;   INR: 1.41 ratio                   RADIOLOGY & ADDITIONAL TESTS:    Imaging Personally Reviewed:    Consultant(s) Notes Reviewed:      Care Discussed with Consultants/Other Providers:

## 2017-06-12 NOTE — DISCHARGE NOTE ADULT - PLAN OF CARE
Remain with good hemoglobin level Continue iron supplements  Eat iron and protein rich foods including: meat, dark leafy green vegetables, and beans.  Limit caffeine.  Notify your doctor if you experience heartburn, constipation, diarrhea, nausea/vomiting, dizziness or are feeling extremely tired.  Seek immediate care or call 911 if you experience:  shortness of breath even at rest, you have blood in your bowel movement or vomit, if you are too dizzy to stand up There are 2 common types of GI Bleed, Upper GI Bleed and Lower GI Bleed.  Upper GI Bleed affects the esophagus, stomach, and first part of the small intestine. Lower GI Bleed affects the colon and rectum.  Upper GI Bleed signs and symptoms to notify your Health Care Provider are vomiting blood, or coffee ground vomitus, and bowel movements that look like black tar.  Lower GI Bleed signs and symptoms to notify your health care provider are bright red bloody bowel movements.   Take your medications as prescribed by your Gastroenterologist.  If you have had an Endoscopy or Colonoscopy, follow up with your Gastroenterologist for Pathology results.  Avoid NSAIDs unless your Health Care Provider tells you that it is ok (Aspirin, Ibuprofen, Advil, Motrin, Aleve).  Follow up with your Gastroenterologist within 1-2 weeks of discharge. Stop coumadin due to risk of bleeding Atrial fibrillation is the most common heart rhythm problem & has the risk of stroke & heart attack  It helps if you control your blood pressure, not drink more than 1-2 alcohol drinks per day, cut down on caffeine, getting treatment for over active thyroid gland, & getting exercise  Call your doctor if you feel your heart racing or beating unusually, chest tightness or pain, lightheaded, faint, shortness of breath especially with exercise  It is important to take your heart medication as prescribed  Continue baby aspirin Weigh yourself daily.  If you gain 3lbs in 3 days, or 5lbs in a week call your Health Care Provider.  Do not eat or drink foods containing more than 2000mg of salt (sodium) in your diet every day.  Call your Health Care Provider if you have any swelling or increased swelling in your feet, ankles, and/or stomach.  The Pt was provided with CHF diet instruction (low sodium diet, daily weights, label reading, Heart Healthy Cooking Tips & Heart Healthy shopping Tips).  Take all of your medication as directed.  If you become dizzy call your Health Care Provider.

## 2017-06-12 NOTE — DISCHARGE NOTE ADULT - MEDICATION SUMMARY - MEDICATIONS TO STOP TAKING
I will STOP taking the medications listed below when I get home from the hospital:    warfarin 4 mg oral tablet  -- 1 tab(s) by mouth once a day

## 2017-06-12 NOTE — DISCHARGE NOTE ADULT - MEDICATION SUMMARY - MEDICATIONS TO TAKE
I will START or STAY ON the medications listed below when I get home from the hospital:    spironolactone 25 mg oral tablet  -- 1 tab(s) by mouth once a day  -- Indication: For CHF    aspirin 81 mg oral tablet, chewable  -- 1 tab(s) by mouth once a day  -- Indication: For CAD    sertraline 50 mg oral tablet  -- 1 tab(s) by mouth once a day  -- Indication: For depression    metoprolol succinate 200 mg oral tablet, extended release  -- 1 tab(s) by mouth once a day  -- Indication: For CHF    furosemide 40 mg oral tablet  -- 1 tab(s) by mouth once a day  -- Indication: For CHF    pantoprazole 40 mg oral delayed release tablet  -- 1 tab(s) by mouth once a day  -- It is very important that you take or use this exactly as directed.  Do not skip doses or discontinue unless directed by your doctor.  Obtain medical advice before taking any non-prescription drugs as some may affect the action of this medication.  Swallow whole.  Do not crush.    -- Indication: For GI bleed I will START or STAY ON the medications listed below when I get home from the hospital:    spironolactone 25 mg oral tablet  -- 1 tab(s) by mouth once a day  -- Indication: For CHF (congestive heart failure)    aspirin 81 mg oral tablet, chewable  -- 1 tab(s) by mouth once a day  -- Indication: For Heart    sertraline 50 mg oral tablet  -- 1 tab(s) by mouth once a day  -- Indication: For Mood    metoprolol succinate 200 mg oral tablet, extended release  -- 1 tab(s) by mouth once a day  -- Indication: For CHF (congestive heart failure)    furosemide 40 mg oral tablet  -- 1 tab(s) by mouth once a day  -- Indication: For CHF (congestive heart failure)    pantoprazole 40 mg oral delayed release tablet  -- 1 tab(s) by mouth once a day  -- It is very important that you take or use this exactly as directed.  Do not skip doses or discontinue unless directed by your doctor.  Obtain medical advice before taking any non-prescription drugs as some may affect the action of this medication.  Swallow whole.  Do not crush.    -- Indication: For GERD

## 2017-06-12 NOTE — DISCHARGE NOTE ADULT - ADDITIONAL INSTRUCTIONS
Make appointments to follow up with your out patient physicians.  Bring all discharge paperwork including discharge medication list to your follow up appointments.  Follow up with DAVE Angulo, in 7-10 days

## 2017-06-12 NOTE — DISCHARGE NOTE ADULT - HOME CARE AGENCY
A nurse will visit you in your home from [facility].  They will contact you to confirm their visit.  Call [phone] or [phone] with any questions.

## 2017-06-12 NOTE — DISCHARGE NOTE ADULT - PATIENT PORTAL LINK FT
“You can access the Follow[employer] Patient Portal, offered by [facility], by registering with the following website: [email or web address]”

## 2017-06-12 NOTE — DISCHARGE NOTE ADULT - CARE PLAN
Principal Discharge DX:	Anemia  Goal:	Remain with good hemoglobin level  Instructions for follow-up, activity and diet:	Continue iron supplements  Eat iron and protein rich foods including: meat, dark leafy green vegetables, and beans.  Limit caffeine.  Notify your doctor if you experience heartburn, constipation, diarrhea, nausea/vomiting, dizziness or are feeling extremely tired.  Seek immediate care or call 911 if you experience:  shortness of breath even at rest, you have blood in your bowel movement or vomit, if you are too dizzy to stand up  Secondary Diagnosis:	GI bleed  Instructions for follow-up, activity and diet:	There are 2 common types of GI Bleed, Upper GI Bleed and Lower GI Bleed.  Upper GI Bleed affects the esophagus, stomach, and first part of the small intestine. Lower GI Bleed affects the colon and rectum.  Upper GI Bleed signs and symptoms to notify your Health Care Provider are vomiting blood, or coffee ground vomitus, and bowel movements that look like black tar.  Lower GI Bleed signs and symptoms to notify your health care provider are bright red bloody bowel movements.   Take your medications as prescribed by your Gastroenterologist.  If you have had an Endoscopy or Colonoscopy, follow up with your Gastroenterologist for Pathology results.  Avoid NSAIDs unless your Health Care Provider tells you that it is ok (Aspirin, Ibuprofen, Advil, Motrin, Aleve).  Follow up with your Gastroenterologist within 1-2 weeks of discharge.  Secondary Diagnosis:	INR (international normal ratio) abnormal  Instructions for follow-up, activity and diet:	Stop coumadin due to risk of bleeding  Secondary Diagnosis:	Atrial fibrillation  Instructions for follow-up, activity and diet:	Atrial fibrillation is the most common heart rhythm problem & has the risk of stroke & heart attack  It helps if you control your blood pressure, not drink more than 1-2 alcohol drinks per day, cut down on caffeine, getting treatment for over active thyroid gland, & getting exercise  Call your doctor if you feel your heart racing or beating unusually, chest tightness or pain, lightheaded, faint, shortness of breath especially with exercise  It is important to take your heart medication as prescribed  Continue baby aspirin  Secondary Diagnosis:	CHF (congestive heart failure)  Instructions for follow-up, activity and diet:	Weigh yourself daily.  If you gain 3lbs in 3 days, or 5lbs in a week call your Health Care Provider.  Do not eat or drink foods containing more than 2000mg of salt (sodium) in your diet every day.  Call your Health Care Provider if you have any swelling or increased swelling in your feet, ankles, and/or stomach.  The Pt was provided with CHF diet instruction (low sodium diet, daily weights, label reading, Heart Healthy Cooking Tips & Heart Healthy shopping Tips).  Take all of your medication as directed.  If you become dizzy call your Health Care Provider.
